# Patient Record
Sex: MALE | Race: BLACK OR AFRICAN AMERICAN
[De-identification: names, ages, dates, MRNs, and addresses within clinical notes are randomized per-mention and may not be internally consistent; named-entity substitution may affect disease eponyms.]

---

## 2017-12-14 NOTE — PDOC
History of Present Illness





- General


Chief Complaint: Back Pain


Stated Complaint: BACK PAIN/REAR PAIN


Time Seen by Provider: 12/14/17 14:25


History Source: Patient


Exam Limitations: No Limitations





- History of Present Illness


Initial Comments: 





12/14/17 15:20


CHIEF COMPLAINT: [Lower back pain] 





HISTORY OF PRESENT ILLNESS:[52]-year-old [male], history of low back pain two 

years ago.  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 

to have pain to the left lateral lower back into left lateral buttock.  No 

neurosensory deficits, no bowel or bladder difficulty incontinence or urinary 

retention, no saddle anesthesia, no footdrop. No history of IVDU or history of 

cancer. ] 





REVIEW OF SYSTEMS:


GENERAL: Afebrile, denies any weakness


RESPIRATORY: No cough, wheezing, or hemoptysis.


CARDIAC: No chest pain or shortness of breath


MUSCULOSKELETAL: Pain to left lateral lower back. No point tenderness. Pain 

worse on left then right ] 


SKIN : No erythema, no bruising, no deformity.


GI/: Denies any abdominal pain, no urinary difficulty, incontinence or 

urinary retention.


RECTAL: Denies any difficulty this A.m.


NEUROLOGICAL: Denies any numbness or tingling. No neurosensory deficits.





PHYSICAL EXAM:


GENERAL: The patient is awake, alert, and fully oriented, in no acute distress.


RESPIRATORY: Lungs clear bilaterally, no rhonchi wheezes or crackles


CARDIAC: S1-S2 audible, no murmur rub or gallop


MUSCULOSKELETAL: Pain to generalized lower back, nonradiating, no tingling or 

sensory deficit. Less than 2 second cap refill, +4 popliteal and pedal pulses.


GI/: Abdomen soft, nontender, nondistended. No rebound tenderness. No masses 

palpable.


MUSCULOSKELETAL: No spinal point tenderness. Normal reflexive and no deficits 

to sensation or strength.


RECTAL: Good rectal tone


SKIN: Warm, Dry, normal turgor, no erythema, no edema no bruising.














Past History





- Past Medical History


Allergies/Adverse Reactions: 


 Allergies











Allergy/AdvReac Type Severity Reaction Status Date / Time


 


No Known Allergies Allergy   Verified 12/14/17 14:22











Home Medications: 


Ambulatory Orders





Cyclobenzaprine HCl [Flexeril 10 mg] 10 mg PO BID PRN #20 tablet MDD 2 12/14/17 


Methylprednisolone [Medrol Dose Jaydon] 4 mg PO ASDIR #21 tablet 12/14/17 








COPD: No





- Immunization History


Immunization Up to Date: Yes





- Suicide/Smoking/Psychosocial Hx


Smoking History: Current every day smoker


Have you smoked in the past 12 months: Yes


Number of Cigarettes Smoked Daily: 10


Information on smoking cessation initiated: Yes


'Breaking Loose' booklet given: 12/14/17


Hx Alcohol Use: No


Drug/Substance Use Hx: No


Substance Use Type: None





*Physical Exam





- Vital Signs


 Last Vital Signs











Temp Pulse Resp BP Pulse Ox


 


 98.0 F   94 H  18   129/74   100 


 


 12/14/17 14:23  12/14/17 14:23  12/14/17 14:23  12/14/17 14:23  12/14/17 14:23














Medical Decision Making





- Medical Decision Making





12/14/17 15:48


A/P: Patient with lower back pain, lifts heavy things and a daily basis denies 

any neurosensory deficits no footdrop or saddle anesthesia.  No bowel or 

bladder difficulty.  





Patient with sciatic pain, toradol 60 mg IM x 1 and Valium 5 mg po given.  Will 

reevaluate.  


12/14/17 18:32


Patient reports he feels a little better after the Toradol DC patient home on 

Medrol Dosepak and Flexeril, strict follow-up with orthopedics. Proper lifting 

technique.  





I discussed the physical exam findings, ancillary test results and final 

diagnoses with the patient. I answered all of the patient's questions.


The patient was satisfied with the care received and felt comfortable with the 

discharge plan and treatment plan. 


The patient will call  to arrange follow-up and will return to the Emergency 

Department with any new, persistent or worsening symptoms. 





*DC/Admit/Observation/Transfer


Diagnosis at time of Disposition: 


Back pain


Qualifiers:


 Back pain location: low back pain Chronicity: acute Back pain laterality: left 

Sciatica presence: with sciatica Sciatica laterality: sciatica of left side 

Qualified Code(s): M54.42 - Lumbago with sciatica, left side








- Discharge Dispostion


Disposition: HOME


Condition at time of disposition: Good


Admit: No





- Prescriptions


Prescriptions: 


Cyclobenzaprine HCl [Flexeril 10 mg] 10 mg PO BID PRN #20 tablet MDD 2


 PRN Reason: Pain


Methylprednisolone [Medrol Dose Jaydon] 4 mg PO ASDIR #21 tablet





- Referrals


Referrals: 


Brady Walker MD [Staff Physician] - 





- Patient Instructions


Printed Discharge Instructions:  DI for Low Back Pain


Additional Instructions: 


1. Please return to the emergency department with any numbness, tingling, 

weakness, numbness or tingling to groin or legs, or loss of bowel or bladder 

function.





2. Use pain medication as ordered.





3. Please is to followup in the office of  [Denise] for evaluation within a 

week if no improvement.





4. Ice or heat





5. Refrain from lifting anything above 10 pounds, until pain resolved.














- Post Discharge Activity


Forms/Work/School Notes:  Back to Work

## 2017-12-14 NOTE — PDOC
Rapid Medical Evaluation


Time Seen by Provider: 12/14/17 14:25


Medical Evaluation: 


 Allergies











Allergy/AdvReac Type Severity Reaction Status Date / Time


 


No Known Allergies Allergy   Verified 12/14/17 14:22











12/14/17 14:25


The patient presents with a chief complaint of: L lower back pain for one day. 

Pt. is a .


I have performed a brief in-person evaluation of this patient;


Pertinent physical exam findings: TTP of the L lower back, buttock


 I have ordered the following: Nothing


The patient will proceed to the ED for further evaluation.

## 2018-05-21 NOTE — PDOC
History of Present Illness





- General


Chief Complaint: Pain


Stated Complaint: RIGHT SIDE PAIN/GROIN PAIN


Time Seen by Provider: 05/21/18 08:46


History Source: Patient


Exam Limitations: No Limitations





- History of Present Illness


Initial Comments: 





05/21/18 09:15


Best Contact: [phone]


PCP:none





Pmhx:N/A


Pshx: 1980's/ bilat knee arthroscopy


Allergies: NKDA





53-year-old male presents to the ER complaining of right lower back pain which 

radiates into the right buttocks and down the lateral right leg to the lateral 

knee since yesterday. Patient denies any injuries/fall/trauma. Pain is 

described as 4/10 dull discomfort. There are no exacerbating or alleviating 

factors. Patient denies fever, chills, nausea/vomiting, chest pain, shortness 

of breath, abdominal pains, urinary symptoms. Eyes history of renal colic.





Past History





- Past Medical History


Allergies/Adverse Reactions: 


 Allergies











Allergy/AdvReac Type Severity Reaction Status Date / Time


 


No Known Allergies Allergy   Verified 05/21/18 07:34











Home Medications: 


Ambulatory Orders





NK [No Known Home Medication]  05/21/18 








COPD: No


Other medical history: DENIES.





- Immunization History


Immunization Up to Date: Yes





- Suicide/Smoking/Psychosocial Hx


Smoking History: Current every day smoker


Have you smoked in the past 12 months: Yes


Number of Cigarettes Smoked Daily: 10


Information on smoking cessation initiated: No


'Breaking Loose' booklet given: 12/14/17


Hx Alcohol Use: No


Drug/Substance Use Hx: No


Substance Use Type: None





**Review of Systems





- Review of Systems


Able to Perform ROS?: Yes


Comments:: 





05/21/18 09:18


CONSTITUTIONAL: 


Absent: fever, chills, diaphoresis, generalized weakness, malaise, loss of 

appetite


HEENT: 


Absent: rhinorrhea, nasal congestion, throat pain, throat swelling, difficulty 

swallowing, mouth swelling, ear pain, eye pain, visual Changes


CARDIOVASCULAR: 


Absent: chest pain, loss of consciousness, palpitations, irregular heart rate, 

peripheral edema


RESPIRATORY: 


Absent: cough, shortness of breath, dyspnea with exertion, orthopnea, wheezing, 

stridor, hemoptysis


GASTROINTESTINAL:


Absent: abdominal pain, abdominal distension, nausea, vomiting, diarrhea, 

constipation, melena, hematochezia


GENITOURINARY: 


Absent: dysuria, frequency, urgency, hesitancy, hematuria, flank pain, genital 

pain


MUSCULOSKELETAL: 





+Right low back pain /radiating to right buttock/lat upper leg to knee


Absent: myalgia, arthralgia, joint swelling


SKIN: 


Absent: rash, itching, pallor


HEMATOLOGIC/IMMUNOLOGIC: 


Absent: easy bleeding, easy bruising, lymphadenopathy, frequent infections


ENDOCRINE:


Absent: unexplained weight gain, unexplained weight loss, heat intolerance, 

cold intolerance


NEUROLOGIC: 


Absent: headache, focal weakness or paresthesias, dizziness, unsteady gait, 

seizure, mental status changes, bladder or bowel incontinence


PSYCHIATRIC: 


Absent: anxiety, depression, suicidal or homicidal ideation, hallucinations.


Is the patient limited English proficient: No





*Physical Exam





- Vital Signs


 Last Vital Signs











Temp Pulse Resp BP Pulse Ox


 


 98.3 F   106 H  19   130/55   99 


 


 05/21/18 07:34  05/21/18 07:34  05/21/18 07:34  05/21/18 07:34  05/21/18 07:34














- Physical Exam


Comments: 





05/21/18 09:18


GENERAL:


Well developed, well nourished. Awake and alert. No acute distress.


HEENT:


Normocephalic, atraumatic. PERRLA, EOMI. No conjunctival pallor. Sclera are non-

icteric. Moist mucous membranes. Oropharynx is clear.


NECK: 


Supple. Full ROM. No JVD. Carotid pulses 2+ and symmetric, without bruits. No 

thyromegaly. No lymphadenopathy.


CARDIOVASCULAR:


Regular rate and rhythm. No murmurs, rubs, or gallops. Distal pulses are 2+ and 

symmetric. 


PULMONARY: 


No evidence of respiratory distress. Lungs clear to auscultation bilaterally. 

No wheezing, rales or rhonchi.


ABDOMINAL:


Soft. Non-tender. Non-distended. No rebound or guarding. No organomegaly. 

Normoactive bowel sounds. 


MUSCULOSKELETAL 


Normal range of motion at all joints. No bony deformities or tenderness. No CVA 

tenderness.


EXTREMITIES: 


No cyanosis. No clubbing. No edema. No calf tenderness.


SKIN: 


Warm and dry. Normal capillary refill. No rashes. No jaundice. 


NEUROLOGICAL: 


Alert, awake, appropriate. Cranial nerves 2-12 intact. No deficits to light 

touch and temperature in face, upper extremities and lower extremities. No 

motor deficits in the in face, upper extremities and lower extremities. 

Normoreflexic in the upper and lower extremities. Normal speech. Toes are down-

going bilaterally. Gait is normal without ataxia.


PSYCHIATRIC: 


Cooperative. Good eye contact. Appropriate mood and affect.





Moderate Sedation





- Procedure Monitoring


Vital Signs: 


 Vital Signs











Temp Pulse Resp BP Pulse Ox


 


 98.3 F   106 H  19   130/55   99 


 


 05/21/18 07:34  05/21/18 07:34  05/21/18 07:34  05/21/18 07:34  05/21/18 07:34














*DC/Admit/Observation/Transfer


Diagnosis at time of Disposition: 


Back pain


Qualifiers:


 Back pain location: low back pain Chronicity: acute Back pain laterality: 

right Sciatica presence: unspecified whether sciatica present Qualified Code(s)

: M54.5 - Low back pain








- Discharge Dispostion


Condition at time of disposition: Stable


Decision to Admit order: No





- Referrals


Referrals: 


Brady Walker MD [Staff Physician] - 





- Patient Instructions


Printed Discharge Instructions:  DI for Low Back Pain


Additional Instructions: 


Ice; 20 mins on alternating with 20 mins off for 48 hours while awake.


Rest





Follow up with your orthopedic surgeon or the one listed on the discharge form.





Return to the ER for severe/persistent/worsening symptoms, extremity numbness/

tingling sensation.





- Post Discharge Activity

## 2018-09-16 NOTE — PDOC
Attending Attestation





- Resident


Resident Name: Samuel Ferrera





- ED Attending Attestation


I have performed the following: I have examined & evaluated the patient, The 

case was reviewed & discussed with the resident, I agree w/resident's findings 

& plan, Exceptions are as noted





- HPI


HPI: 





09/16/18 09:38





53-year-old male with reportedly no past medical history, half pack per day 

smoker and marijuana smoker presents with difficulty breathing and yellowish 

sputum cough for one week. The patient denies a fevers or chills. States that 

the weather changes exasperate the symptoms. Never been formally diagnosed with 

asthma or emphysema. Denies chest pain.





- Physicial Exam


PE: 





09/16/18 09:40





GENERAL: Awake, alert, and fully oriented, in no acute distress


HEAD: No signs of trauma


EYES: EOMI, sclera anicteric, conjunctiva clear


ENT: Auricles normal inspection, hearing grossly normal, nares patent, Moist 

mucosa


NECK: Normal ROM, supple


LUNGS: Tight breath sounds and feint expiratory wheezing bilaterally.


HEART: Regular rate and rhythm, normal S1 and S2, no murmurs, rubs or gallops


EXTREMITIES: Normal range of motion, no edema.  No clubbing or cyanosis. No 

cords, erythema, or tenderness


NEUROLOGICAL: Cranial nerves II through XII grossly intact.  Normal speech, 

normal gait


SKIN: Warm, Dry, normal turgor, no rashes or lesions noted.





- Medical Decision Making





09/16/18 09:42





 Vital Signs











Temp Pulse Resp BP Pulse Ox


 


 98.1 F   92 H  20   128/79   93 L


 


 09/16/18 08:59  09/16/18 08:59  09/16/18 08:59  09/16/18 08:59  09/16/18 08:59








I suspect the patient likely has underlying COPD.


Will treat with dubonebs and prednisone.


Chest xray to r/o PNA.


If chest xray negative, will treat as bronchitis with azithromycin.


If symptoms improve, pt can follow up as outpatient.


09/16/18 10:01





Chest xray reviewed. No acute findings.





**Heart Score/ECG Review


  ** #1


ECG reviewed & interpreted by me at: 09:30





09/16/18 09:42


NSR 91, no std/marko, normal axis, normal intervals,  msec

## 2018-09-16 NOTE — PDOC
History of Present Illness





- General


Chief Complaint: Respiratory


Stated Complaint: RESPIRATORY


Time Seen by Provider: 09/16/18 09:08





- History of Present Illness


Initial Comments: 





09/16/18 09:22


The patient is a 53 year old male who denies significant PMH who presents for 

evaluation of SOB, cough, and body aches.  The patient reports a 1 week history 

of worsening non-productive cough with associated SOB and body aches prompting 

his presentation to the ED for further evaluation.  He denies similar symptoms 

in the past and notes that he smokes 1 pack of cigarettes every 2 days and 

marijuana daily.  He otherwise denies fevers, chills, chest pain, nausea, 

vomiting, abdominal pain, or changes with urination or bowel movements.





Past History





- Past Medical History


Allergies/Adverse Reactions: 


 Allergies











Allergy/AdvReac Type Severity Reaction Status Date / Time


 


No Known Allergies Allergy   Verified 09/16/18 09:02











Home Medications: 


Ambulatory Orders





Azithromycin [Zithromax 250mg Tablets -] 250 mg PO UTDICT #6 tab 09/16/18 








COPD: No





- Immunization History


Immunization Up to Date: Yes





- Suicide/Smoking/Psychosocial Hx


Smoking History: Current every day smoker


Have you smoked in the past 12 months: Yes


Number of Cigarettes Smoked Daily: 10


Information on smoking cessation initiated: No


'Breaking Loose' booklet given: 12/14/17


Hx Alcohol Use: No


Drug/Substance Use Hx: No


Substance Use Type: None, Marijuana





**Review of Systems





- Review of Systems


Comments:: 





09/16/18 09:24


Constitutional: Body aches.  No fevers, chills, fatigue, 


HEENT: No Rhinorrhea, nasal congestion, visual changes


Cardiovascular: No chest pain, syncope, palpitations, lightheadedness


Respiratory: Cough, SOB.  No Hemoptysis,


Gastrointestinal: No Abdominal pain, Nausea, Vomiting, Constipation, Diarrhea, 

Melena


Genitourinary: No Dysuria, Frequency, Urgency, Hesitancy, Hematuria, Flank pain


Musculoskeletal: No Myalgia, arthralgia


Skin: No rashes, itching, bruising, pallor


Neurologic: No Headache, Dizziness, Numbness, Weakness, or Tingling.


Psychiatric: No Hallucinations. No SI or HI





*Physical Exam





- Vital Signs


 Last Vital Signs











Temp Pulse Resp BP Pulse Ox


 


 98.1 F   92 H  20   128/79   93 L


 


 09/16/18 08:59  09/16/18 08:59  09/16/18 08:59  09/16/18 08:59  09/16/18 08:59














- Physical Exam


Comments: 





09/16/18 09:24


General Appearance: Nourished. No Apparent Distress


HEENT:  No Pharyngeal Erythema, Tonsillar Exudate, Tonsillar Erythema


Neck: No Cervical Lymphadenopathy


Respiratory/Chest: Expiratory wheezing noted on exam.  No Crackles, Rales, 

Rhonchi, 


Cardiovascular: Regular Rhythm, Regular Rate. No Murmur, Gallops, Rubs


Gastrointestinal/Abdominal: Normal Bowel Sounds, Soft. No Guarding, Rebound, 

Tenderness


Musculoskeletal: No CVA Tenderness


Extremity: Normal Capillary Refill


Integumentary: Normal Color, Dry, Warm


Neurologic: Fully Oriented, Alert, Normal Mood/Affect, Normal Response, 





**Heart Score/ECG Review


  ** #1


ECG reviewed & interpreted by me at: 09:27


General ECG Interpretation: Sinus Rhythm, Normal Rate, Normal Intervals, No 

acute ischemic changes





Medical Decision Making





- Medical Decision Making





09/16/18 09:25


The patient is a 53 year old male who denies significant PMH who presents for 

evaluation of SOB, cough, and body aches.  Differential includes but is not 

limited to: COPD exacerbation, pneumonia.  Given the patient's history and 

physical exam, we will obtain a chest plain film and ekg to evaluate further.  

We will treat with duonebs and oral prednisone and continue to monitor and 

reassess while here in the ED.





09/16/18 10:51


The patient reports improvement in his symptoms.  Chest plain film is 

unremarkable as read by our radiologist.  We are comfortable discharging the 

patient home on azithromycin with primary care provider follow up.  We 

discussed the results, plan, and return precautions with the patient who voiced 

understanding and is agreeable with the plan.





*DC/Admit/Observation/Transfer


Diagnosis at time of Disposition: 


 Bronchitis





COPD (chronic obstructive pulmonary disease)


Qualifiers:


 COPD type: unspecified COPD Qualified Code(s): J44.9 - Chronic obstructive 

pulmonary disease, unspecified








- Discharge Dispostion


Disposition: HOME


Condition at time of disposition: Stable


Decision to Admit order: No





- Prescriptions


Prescriptions: 


Azithromycin [Zithromax 250mg Tablets -] 250 mg PO UTDICT #6 tab





- Referrals





- Patient Instructions


Printed Discharge Instructions:  DI for Acute Bronchitis


Additional Instructions: 


Please return to the ER if you experience concerning or worsening symptoms 

including worsening difficulty breathing, weakness, or chest pain.





We have sent a prescription for antibiotics to your pharmacy that you should 

take as directed. Please call to schedule a follow up appointment with your 

primary care provider within 2-3 days to discuss your ER visit and further 

management of your symptoms.











- Post Discharge Activity

## 2018-09-16 NOTE — EKG
Test Reason : 

Blood Pressure : ***/*** mmHG

Vent. Rate : 091 BPM     Atrial Rate : 091 BPM

   P-R Int : 148 ms          QRS Dur : 094 ms

    QT Int : 366 ms       P-R-T Axes : 080 000 062 degrees

   QTc Int : 450 ms

 

NORMAL SINUS RHYTHM

NORMAL ECG

WHEN COMPARED WITH ECG OF 03-FEB-2016 14:56,

VENT. RATE HAS INCREASED BY  32 BPM

QT HAS LENGTHENED

Confirmed by JW LR MD ([id number]) on 9/16/2018 9:11:51 PM

 

Referred By:             Confirmed By:JW LR MD

## 2019-08-12 NOTE — PDOC
Rapid Medical Evaluation


Chief Complaint: Nausea/Vomiting


Time Seen by Provider: 08/12/19 21:27


Medical Evaluation: 


 Allergies











Allergy/AdvReac Type Severity Reaction Status Date / Time


 


No Known Allergies Allergy   Verified 09/16/18 09:02











08/12/19 21:28


HPI: Abdominal pain and vomiting x1 day 





PE: No gross deficits but appears uncomfortable 





ORDERS: Labs





**Discharge Disposition





- Diagnosis


 Abdominal pain








- Referrals





- Patient Instructions





- Post Discharge Activity

## 2019-08-13 NOTE — EKG
Test Reason : 

Blood Pressure : ***/*** mmHG

Vent. Rate : 094 BPM     Atrial Rate : 094 BPM

   P-R Int : 158 ms          QRS Dur : 096 ms

    QT Int : 368 ms       P-R-T Axes : 079 028 078 degrees

   QTc Int : 460 ms

 

*** POOR DATA QUALITY, INTERPRETATION MAY BE ADVERSELY AFFECTED

NORMAL SINUS RHYTHM

NORMAL ECG

WHEN COMPARED WITH ECG OF 16-SEP-2018 09:26,

NO SIGNIFICANT CHANGE WAS FOUND

Confirmed by Mainor Cox MD ([id number]) on 8/13/2019 11:24:56 AM

 

Referred By:             Confirmed By:Mainor Cox MD

## 2019-08-13 NOTE — PDOC
History of Present Illness





- General


Chief Complaint: Nausea/Vomiting


Stated Complaint: VOMITING


Time Seen by Provider: 08/12/19 21:27





- History of Present Illness


Initial Comments: 





08/13/19 05:40


54M with no pmh presents to the ED with nausea and vomiting since this morning. 


Complains of diffuse abdominal pain from the retching.





Did not eat anything usual last night but did smoke significant amount of 

marijuana. 


No sick contacts. 








Past History





- Past Medical History


Allergies/Adverse Reactions: 


 Allergies











Allergy/AdvReac Type Severity Reaction Status Date / Time


 


No Known Allergies Allergy   Verified 09/16/18 09:02











Home Medications: 


Ambulatory Orders





Azithromycin [Zithromax 250mg Tablets -] 250 mg PO UTDICT #6 tab 09/16/18 








COPD: No





- Immunization History


Immunization Up to Date: Yes





- Suicide/Smoking/Psychosocial Hx


Smoking History: Never smoked


Have you smoked in the past 12 months: Yes


Number of Cigarettes Smoked Daily: 10


'Breaking Loose' booklet given: 12/14/17


Hx Alcohol Use: No


Drug/Substance Use Hx: No


Substance Use Type: None, Marijuana





**Review of Systems





- Review of Systems


Able to Perform ROS?: Yes


Is the patient limited English proficient: No


Constitutional: No: Symptoms Reported, Chills, Fever, Weakness


HEENTM: No: Symptoms Reported


Respiratory: No: Symptoms reported


Cardiac (ROS): No: Symptoms Reported


ABD/GI: Yes: See HPI, Nausea, Vomiting.  No: Abd. Pain w/ defecation, Blood 

Streaked Bowels, Constipated, Diarrhea, Difficulty Swallowing, Poor Appetite, 

Poor Fluid Intake, Rectal Bleeding, Indigestion


: No: Symptoms Reported


Musculoskeletal: No: Symptoms Reported


Integumentary: No: Symptoms Reported


Neurological: No: Symptoms reported





*Physical Exam





- Vital Signs


 Last Vital Signs











Temp Pulse Resp BP Pulse Ox


 


 98.3 F   102 H  20   144/100   99 


 


 08/12/19 21:22  08/12/19 21:22  08/12/19 21:22  08/12/19 21:22  08/12/19 21:22














- Physical Exam


General Appearance: Yes: Nourished, Appropriately Dressed.  No: Apparent 

Distress


HEENT: positive: EOMI, SANDHYA, Normal ENT Inspection


Respiratory/Chest: positive: Lungs Clear, Normal Breath Sounds.  negative: 

Chest Tender, Respiratory Distress


Cardiovascular: positive: Regular Rhythm, Regular Rate, S1, S2


Gastrointestinal/Abdominal: positive: Normal Bowel Sounds, Tender (diffusely, 

but more so suprapubic), Soft


Musculoskeletal: positive: Normal Inspection.  negative: CVA Tenderness


Extremity: positive: Normal Capillary Refill, Normal Inspection, Normal Range 

of Motion


Integumentary: positive: Normal Color, Dry, Warm


Neurologic: positive: Fully Oriented, Alert, Normal Mood/Affect, Normal Response

, Motor Strength 5/5





ED Treatment Course





- LABORATORY


CBC & Chemistry Diagram: 


 08/12/19 23:30





 08/12/19 23:50





- Medications


Given in the ED: 


ED Medications














Discontinued Medications














Generic Name Dose Route Start Last Admin





  Trade Name Freq  PRN Reason Stop Dose Admin


 


Ondansetron HCl  4 mg  08/12/19 21:28  08/12/19 23:35





  Zofran Odt -  SL  08/12/19 21:29  4 mg





  ONCE ONE   Administration





     





     





     





     














Medical Decision Making





- Medical Decision Making





08/13/19 04:45





54m With n/v and abdominal pain. 


Likely cannabinoid hyperemesis, possible viral gastro, appendicitis... 


Will treat nausea with zofran, replenish fluids, 


Obtain ct for abdominal tenderness. 





CT read: 


 Small umbilical hernia which bowel mildly protrudes but is not entrapped.


No definite evidence of acute pathology.


08/13/19 05:49





Patient feels better. Hungry. Ok to dc and follow up. 





*DC/Admit/Observation/Transfer


Diagnosis at time of Disposition: 


 Nausea vomiting and diarrhea








- Discharge Dispostion


Disposition: HOME


Condition at time of disposition: Improved


Decision to Admit order: No





- Referrals





- Patient Instructions


Printed Discharge Instructions:  DI for Nausea -- Adult, DI for Vomiting -- 

Adult


Additional Instructions: 


Come back to the emergency department for any new, worsening and concerning 

symptom. 


Follow up with your primary physician within the week.





- Post Discharge Activity

## 2019-08-13 NOTE — PDOC
Attending Attestation





- Resident


Resident Name: Melvin Grider





- ED Attending Attestation


I have performed the following: I have examined & evaluated the patient, The 

case was reviewed & discussed with the resident, I agree w/resident's findings 

& plan





- HPI


HPI: 


54-year-old male with vomiting diarrhea and abdominal pain. Patient admits to 

marijuana use.





- Physicial Exam


PE: 





08/13/19 02:11


GENERAL: Awake, in no acute distress


HEAD: No signs of trauma


EYES:


ENT:clear without exudates. Moist mucosa


NECK: Normal ROM, 


LUNGS:. Normal work of breathing.


HEART: Regular rate and rhythm,


ABDOMEN: diffuse tenderness abdomen nondistended


CHEST WALL: 


BACK: No midline tenderness.


EXTREMITIES:. No erythema, or tenderness


NEUROLOGICAL: Alert, 


SKIN: Warm, Dry





- Medical Decision Making





08/13/19 02:11


54-year-old male with vomiting diarrhea and abdominal pain


Plan for IV fluids, antiemetics and antacids


CT scan of the abdomen and pelvis due to persistent tenderness


Patient currently asking for juice, he will be a likely DC home if CT scan 

negative

## 2019-11-17 NOTE — PDOC
History of Present Illness





- General


Chief Complaint: Edema


Stated Complaint: LT KNEE SWOLLEN


Time Seen by Provider: 11/17/19 10:11


History Source: Patient


Exam Limitations: No Limitations





- History of Present Illness


Initial Comments: 





11/17/19 10:25


54-year-old male with history of bilateral knee surgery over 15 years ago 

presents to the ED with progressively worsening left knee pain.  Patient states 

normally has to take Motrin in the morning to get to work since he works as a 

.  Patient states the past month and has been becoming 

swollen by the end of the day but for the past week it has been swollen 24 

hours and has to take Motrin or Tylenol for discomfort.  Patient denies fever, 

chills, redness or increased warmth to the area.  Patient states is able to 

bend his knee but with severe discomfort which she describes as a pressure-like 

sensation behind the knee. patient denies history of diabetes or 

immunosuppression.. 


Is this a multiple visit Asthma Patient?: No


Timing/Duration: 24 hours


Severity: mild, moderate


Associated Symptoms: reports: denies symptoms





Past History





- Travel


Traveled outside of the country in the last 30 days: No


Close contact w/someone who was outside of country & ill: No





- Past Medical History


Allergies/Adverse Reactions: 


 Allergies











Allergy/AdvReac Type Severity Reaction Status Date / Time


 


No Known Allergies Allergy   Verified 11/17/19 09:57











Home Medications: 


Ambulatory Orders





Azithromycin [Zithromax 250mg Tablets -] 250 mg PO UTDICT #6 tab 09/16/18 


Oxycodone HCl/Acetaminophen [Percocet 5-325 mg Tablet] 1 - 2 tab PO Q6H PRN #12 

tab MDD 4 11/17/19 








COPD: No





- Immunization History


Immunization Up to Date: Yes





- Psycho Social/Smoking Cessation Hx


Smoking History: Current every day smoker


Have you smoked in the past 12 months: Yes


Number of Cigarettes Smoked Daily: 10


Information on smoking cessation initiated: Yes


'Breaking Loose' booklet given: 12/14/17


Hx Alcohol Use: Yes


Drug/Substance Use Hx: Yes


Substance Use Type: None, Marijuana


Patient Lives Alone: No


Lives with/in: spouse/SO





**Review of Systems





- Review of Systems


Able to Perform ROS?: Yes


Constitutional: No: Symptoms Reported


Cardiac (ROS): No: Symptoms Reported


ABD/GI: No: Nausea


Musculoskeletal: Yes: Joint Pain, Joint Swelling.  No: Muscle Pain


Integumentary: No: Erythema


Neurological: No: Symptoms reported


Endocrine: No: Symptoms Reported


Hematologic/Lymphatic: No: Symptoms Reported





*Physical Exam





- Vital Signs


 Last Vital Signs











Temp Pulse Resp BP Pulse Ox


 


 98.2 F   110 H  18   127/68   98 


 


 11/17/19 09:57  11/17/19 09:57  11/17/19 09:57  11/17/19 09:57  11/17/19 09:57














- Physical Exam


General Appearance: Yes: Nourished, Appropriately Dressed.  No: Apparent 

Distress


Vascular Pulses: Doralis-Pedis (L): 2+


Extremity: positive: Normal Capillary Refill, Normal Range of Motion, Tender (

To the lateral aspect of left patella.  Palpable fluid to the lateral aspect of 

left patella.  No increased warmth or erythema noted. )


Integumentary: positive: Normal Color, Warm, Moist


Neurologic: positive: Motor Strength 5/5 (Ambulatory but with limp)





ED Treatment Course





- RADIOLOGY


Radiology Studies Ordered: 














 Category Date Time Status


 


 KNEE 3 POS-LEFT [RAD] Stat Radiology  11/17/19 10:11 Ordered














Medical Decision Making





- Medical Decision Making





11/17/19 10:28


Chief complaint: Left knee pain progressively worsening over the past few 

months patient with arthroscopic surgery to bilateral knees numerous years ago 

but states his line of work he feels is aggravating the knee.  


Exam:  2+ pedal pulse no erythema to the left patella.  Palpable joint effusion 

noted no post patellar tenderness   But with noted tenderness to the lateral 

aspect of left patella near effusion


Plan: X-ray of the knee Percocet along with Tylenol.   plan to discharge with 

Percocet knee immobilizer crutches and Ortho referral


11/17/19 11:30


X-ray negative for fracture or mass.  Noted joint effusion on x-ray patient 

given prescription for Percocet along with Ortho referral and knee immobilizer 

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 

construction employee. 








Discharge





- Discharge Information


Problems reviewed: Yes


Clinical Impression/Diagnosis: 


 Joint effusion of knee





Condition: Good


Disposition: HOME





- Additional Discharge Information


Prescriptions: 


Oxycodone HCl/Acetaminophen [Percocet 5-325 mg Tablet] 1 - 2 tab PO Q6H PRN #12 

tab MDD 4


 PRN Reason: Pain





- Follow up/Referral


Referrals: 


Brady Walker MD [Staff Physician] - 





- Patient Discharge Instructions


Patient Printed Discharge Instructions:  DI for Knee Effusion


Additional Instructions: 


At this time I will recommend elevating your leg when not walking applying ice 

to the affected area and use the knee immobilizer when ambulatory or you may 

use the Ace wrap while at work but will only provide minimal support.  


May take Percocet for discomfort.


Please follow-up with orthopedist





- Post Discharge Activity

## 2021-03-24 ENCOUNTER — HOSPITAL ENCOUNTER (EMERGENCY)
Dept: HOSPITAL 74 - JERFT | Age: 56
Discharge: HOME | End: 2021-03-24
Payer: COMMERCIAL

## 2021-03-24 VITALS — TEMPERATURE: 98.5 F | DIASTOLIC BLOOD PRESSURE: 73 MMHG | SYSTOLIC BLOOD PRESSURE: 127 MMHG | HEART RATE: 83 BPM

## 2021-03-24 VITALS — BODY MASS INDEX: 30.9 KG/M2

## 2021-03-24 DIAGNOSIS — L72.3: Primary | ICD-10-CM

## 2021-05-31 ENCOUNTER — HOSPITAL ENCOUNTER (EMERGENCY)
Dept: HOSPITAL 74 - JERFT | Age: 56
Discharge: HOME | End: 2021-05-31
Payer: COMMERCIAL

## 2021-05-31 VITALS — BODY MASS INDEX: 30.7 KG/M2

## 2021-05-31 VITALS — HEART RATE: 88 BPM | TEMPERATURE: 98.1 F | SYSTOLIC BLOOD PRESSURE: 120 MMHG | DIASTOLIC BLOOD PRESSURE: 82 MMHG

## 2021-05-31 DIAGNOSIS — M25.561: Primary | ICD-10-CM

## 2021-05-31 PROCEDURE — 3E0233Z INTRODUCTION OF ANTI-INFLAMMATORY INTO MUSCLE, PERCUTANEOUS APPROACH: ICD-10-PCS

## 2022-05-16 ENCOUNTER — HOSPITAL ENCOUNTER (EMERGENCY)
Dept: HOSPITAL 74 - JER | Age: 57
Discharge: HOME | End: 2022-05-16
Payer: COMMERCIAL

## 2022-05-16 VITALS — SYSTOLIC BLOOD PRESSURE: 137 MMHG | HEART RATE: 72 BPM | DIASTOLIC BLOOD PRESSURE: 60 MMHG | TEMPERATURE: 98 F

## 2022-05-16 VITALS — BODY MASS INDEX: 28.7 KG/M2

## 2022-05-16 DIAGNOSIS — R11.2: Primary | ICD-10-CM

## 2022-05-16 DIAGNOSIS — R10.30: ICD-10-CM

## 2022-05-16 LAB
ALBUMIN SERPL-MCNC: 3.4 G/DL (ref 3.4–5)
ALP SERPL-CCNC: 84 U/L (ref 45–117)
ALT SERPL-CCNC: 59 U/L (ref 13–61)
ANION GAP SERPL CALC-SCNC: 7 MMOL/L (ref 8–16)
APPEARANCE UR: (no result)
AST SERPL-CCNC: 59 U/L (ref 15–37)
BASOPHILS # BLD: 0.5 % (ref 0–2)
BILIRUB SERPL-MCNC: 0.6 MG/DL (ref 0.2–1)
BILIRUB UR STRIP.AUTO-MCNC: NEGATIVE MG/DL
BUN SERPL-MCNC: 10.2 MG/DL (ref 7–18)
CALCIUM SERPL-MCNC: 9.1 MG/DL (ref 8.5–10.1)
CHLORIDE SERPL-SCNC: 100 MMOL/L (ref 98–107)
CO2 SERPL-SCNC: 28 MMOL/L (ref 21–32)
COLOR UR: YELLOW
CREAT SERPL-MCNC: 0.8 MG/DL (ref 0.55–1.3)
DEPRECATED RDW RBC AUTO: 12.9 % (ref 11.9–15.9)
EOSINOPHIL # BLD: 0.3 % (ref 0–4.5)
GLUCOSE SERPL-MCNC: 121 MG/DL (ref 74–106)
HCT VFR BLD CALC: 44.7 % (ref 35.4–49)
HGB BLD-MCNC: 15.1 GM/DL (ref 11.7–16.9)
KETONES UR QL STRIP: (no result)
LEUKOCYTE ESTERASE UR QL STRIP.AUTO: NEGATIVE
LIPASE SERPL-CCNC: 93 U/L (ref 73–393)
LYMPHOCYTES # BLD: 19 % (ref 8–40)
MCH RBC QN AUTO: 30.8 PG (ref 25.7–33.7)
MCHC RBC AUTO-ENTMCNC: 33.8 G/DL (ref 32–35.9)
MCV RBC: 91 FL (ref 80–96)
MONOCYTES # BLD AUTO: 9.8 % (ref 3.8–10.2)
NEUTROPHILS # BLD: 70.4 % (ref 42.8–82.8)
NITRITE UR QL STRIP: NEGATIVE
PH UR: 6.5 [PH] (ref 5–8)
PLATELET # BLD AUTO: 278 10^3/UL (ref 134–434)
PMV BLD: 8.6 FL (ref 7.5–11.1)
PROT SERPL-MCNC: 7.1 G/DL (ref 6.4–8.2)
PROT UR QL STRIP: (no result)
PROT UR QL STRIP: NEGATIVE
RBC # BLD AUTO: 4.91 M/MM3 (ref 4–5.6)
SODIUM SERPL-SCNC: 135 MMOL/L (ref 136–145)
SP GR UR: 1.02 (ref 1.01–1.03)
UROBILINOGEN UR STRIP-MCNC: 1 MG/DL (ref 0.2–1)
WBC # BLD AUTO: 10.4 K/MM3 (ref 4–10)

## 2022-05-16 PROCEDURE — 3E033GC INTRODUCTION OF OTHER THERAPEUTIC SUBSTANCE INTO PERIPHERAL VEIN, PERCUTANEOUS APPROACH: ICD-10-PCS

## 2022-05-16 PROCEDURE — 3E0333Z INTRODUCTION OF ANTI-INFLAMMATORY INTO PERIPHERAL VEIN, PERCUTANEOUS APPROACH: ICD-10-PCS

## 2024-09-26 ENCOUNTER — HOSPITAL ENCOUNTER (EMERGENCY)
Dept: HOSPITAL 74 - JER | Age: 59
LOS: 1 days | Discharge: LEFT BEFORE DISCHARGE | End: 2024-09-27
Payer: COMMERCIAL

## 2024-09-26 VITALS
SYSTOLIC BLOOD PRESSURE: 125 MMHG | HEART RATE: 104 BPM | DIASTOLIC BLOOD PRESSURE: 78 MMHG | TEMPERATURE: 98.3 F | RESPIRATION RATE: 18 BRPM

## 2024-09-26 VITALS — BODY MASS INDEX: 29.9 KG/M2

## 2024-09-26 DIAGNOSIS — S00.91XA: Primary | ICD-10-CM

## 2024-09-26 DIAGNOSIS — R55: ICD-10-CM
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
## 2024-09-26 LAB
ALBUMIN SERPL-MCNC: 3.5 G/DL (ref 3.4–5)
ALP SERPL-CCNC: 96 U/L (ref 45–117)
ALT SERPL-CCNC: 38 U/L (ref 13–61)
ANION GAP SERPL CALC-SCNC: 6 MMOL/L (ref 4–13)
APTT BLD: 30.5 SECONDS (ref 25.2–36.5)
AST SERPL-CCNC: 31 U/L (ref 15–37)
BASOPHILS # BLD: 1.1 % (ref 0–2)
BILIRUB SERPL-MCNC: 0.3 MG/DL (ref 0.2–1)
BUN SERPL-MCNC: 14.6 MG/DL (ref 7–18)
CALCIUM SERPL-MCNC: 9 MG/DL (ref 8.5–10.1)
CHLORIDE SERPL-SCNC: 109 MMOL/L (ref 98–107)
CO2 SERPL-SCNC: 25 MMOL/L (ref 21–32)
CREAT SERPL-MCNC: 0.8 MG/DL (ref 0.55–1.3)
DEPRECATED RDW RBC AUTO: 13.5 % (ref 11.9–15.9)
EOSINOPHIL # BLD: 3 % (ref 0–4.5)
GLUCOSE SERPL-MCNC: 118 MG/DL (ref 74–106)
HCT VFR BLD CALC: 42.6 % (ref 35.4–49)
HGB BLD-MCNC: 14.5 GM/DL (ref 11.7–16.9)
INR BLD: 0.96 (ref 0.83–1.09)
LYMPHOCYTES # BLD: 21.3 % (ref 8–40)
MAGNESIUM SERPL-MCNC: 2.4 MG/DL (ref 1.8–2.4)
MCH RBC QN AUTO: 31.2 PG (ref 25.7–33.7)
MCHC RBC AUTO-ENTMCNC: 34.1 G/DL (ref 32–35.9)
MCV RBC: 91.7 FL (ref 80–96)
MONOCYTES # BLD AUTO: 11.3 % (ref 3.8–10.2)
NEUTROPHILS # BLD: 63.3 % (ref 42.8–82.8)
PLATELET # BLD AUTO: 259 10^3/UL (ref 134–434)
PMV BLD: 7.5 FL (ref 7.5–11.1)
POTASSIUM SERPLBLD-SCNC: 4 MMOL/L (ref 3.5–5.1)
PROT SERPL-MCNC: 7.1 G/DL (ref 6.4–8.2)
PT PNL PPP: 11 SEC (ref 9.7–13)
RBC # BLD AUTO: 4.64 M/MM3 (ref 4–5.6)
SODIUM SERPL-SCNC: 139 MMOL/L (ref 136–145)
WBC # BLD AUTO: 8.1 K/MM3 (ref 4–10)

## 2024-09-27 RX ADMIN — CLOSTRIDIUM TETANI TOXOID ANTIGEN (FORMALDEHYDE INACTIVATED) AND CORYNEBACTERIUM DIPHTHERIAE TOXOID ANTIGEN (FORMALDEHYDE INACTIVATED) ONE: 5; 2 SUSPENSION INTRAMUSCULAR at 00:03

## 2024-09-27 RX ADMIN — SODIUM CHLORIDE ONE ML: 9 INJECTION, SOLUTION INTRAVENOUS at 00:00
